# Patient Record
Sex: FEMALE | Race: WHITE | NOT HISPANIC OR LATINO
[De-identification: names, ages, dates, MRNs, and addresses within clinical notes are randomized per-mention and may not be internally consistent; named-entity substitution may affect disease eponyms.]

---

## 2017-01-16 ENCOUNTER — RX ONLY (OUTPATIENT)
Age: 47
Setting detail: RX ONLY
End: 2017-01-16

## 2017-12-18 ENCOUNTER — RX ONLY (OUTPATIENT)
Age: 47
Setting detail: RX ONLY
End: 2017-12-18

## 2018-03-05 ENCOUNTER — APPOINTMENT (RX ONLY)
Dept: URBAN - NONMETROPOLITAN AREA CLINIC 13 | Facility: CLINIC | Age: 48
Setting detail: DERMATOLOGY
End: 2018-03-05

## 2018-03-05 DIAGNOSIS — Z41.9 ENCOUNTER FOR PROCEDURE FOR PURPOSES OTHER THAN REMEDYING HEALTH STATE, UNSPECIFIED: ICD-10-CM

## 2018-03-05 PROCEDURE — ? LASER HAIR REMOVAL

## 2018-03-05 NOTE — PROCEDURE: LASER HAIR REMOVAL
Cooling: DCD setting
Fluence (Will Not Render If 0): 20
Pre-Procedure: Prior to proceeding the treatment areas were cleaned and all present put on their eye protection.
Render Post-Care In The Note: No
Post-Procedure Care: Immediate endpoint: perifollicular erythema and edema. Vaseline and ice applied. Post care reviewed with patient.
Consent: Written consent obtained, risks reviewed including but not limited to crusting, scabbing, blistering, scarring, darker or lighter pigmentary change, paradoxical hair regrowth, incomplete removal of hair and infection.
Number Of Prepaid Treatments (Will Not Render If 0): 0
Pulse Duration: 5 ms
Spot Size: 1.5 mm
Post-Care Instructions: I reviewed with the patient in detail post-care instructions. Patient should avoid sun for a minimum of 4 weeks before and after treatment.
Laser Type: Alexandrite 755nm
Detail Level: Detailed
Cooling Override: 20/10
Spot Size: 12 mm

## 2018-03-14 ENCOUNTER — APPOINTMENT (RX ONLY)
Dept: URBAN - NONMETROPOLITAN AREA CLINIC 13 | Facility: CLINIC | Age: 48
Setting detail: DERMATOLOGY
End: 2018-03-14

## 2018-03-14 DIAGNOSIS — B35.1 TINEA UNGUIUM: ICD-10-CM

## 2018-03-14 PROCEDURE — ? PRODUCT LINE (OFFICE PRODUCTS)

## 2018-03-14 ASSESSMENT — LOCATION ZONE DERM: LOCATION ZONE: TOE

## 2018-03-14 ASSESSMENT — LOCATION SIMPLE DESCRIPTION DERM: LOCATION SIMPLE: RIGHT GREAT TOE

## 2018-03-14 ASSESSMENT — LOCATION DETAILED DESCRIPTION DERM: LOCATION DETAILED: RIGHT GREAT TOE

## 2018-03-14 NOTE — PROCEDURE: PRODUCT LINE (OFFICE PRODUCTS)
Product 12 Price (In Dollars - Numeric Only, No Special Characters Or $): 0.00
Product 7 Units: 0
Render Product Pricing In Note: Yes
Name Of Product 1: PC Antifungal nail solution
Product 1 Units: 1
Detail Level: Zone
Send Charges To Patient Encounter: No
Product 1 Application Directions: Apply to affected area QD. Lot#[id number]
Product 1 Price (In Dollars - Numeric Only, No Special Characters Or $): 45

## 2018-05-17 ENCOUNTER — APPOINTMENT (RX ONLY)
Dept: URBAN - NONMETROPOLITAN AREA CLINIC 13 | Facility: CLINIC | Age: 48
Setting detail: DERMATOLOGY
End: 2018-05-17

## 2018-05-17 DIAGNOSIS — L73.8 OTHER SPECIFIED FOLLICULAR DISORDERS: ICD-10-CM

## 2018-05-17 DIAGNOSIS — B35.1 TINEA UNGUIUM: ICD-10-CM

## 2018-05-17 DIAGNOSIS — L71.8 OTHER ROSACEA: ICD-10-CM | Status: STABLE

## 2018-05-17 DIAGNOSIS — L71.0 PERIORAL DERMATITIS: ICD-10-CM | Status: WORSENING

## 2018-05-17 DIAGNOSIS — B35.1 TINEA UNGUIUM: ICD-10-CM | Status: STABLE

## 2018-05-17 PROCEDURE — ? RECOMMENDATIONS

## 2018-05-17 PROCEDURE — 99213 OFFICE O/P EST LOW 20 MIN: CPT

## 2018-05-17 PROCEDURE — ? PRESCRIPTION

## 2018-05-17 PROCEDURE — ? PRODUCT LINE (OFFICE PRODUCTS)

## 2018-05-17 PROCEDURE — ? TREATMENT REGIMEN

## 2018-05-17 PROCEDURE — ? COUNSELING

## 2018-05-17 RX ORDER — MINOCYCLINE HYDROCHLORIDE 100 MG/1
CAPSULE ORAL
Qty: 30 | Refills: 1 | Status: ERX | COMMUNITY
Start: 2018-05-17

## 2018-05-17 RX ADMIN — MINOCYCLINE HYDROCHLORIDE: 100 CAPSULE ORAL at 00:00

## 2018-05-17 ASSESSMENT — LOCATION ZONE DERM
LOCATION ZONE: TOE
LOCATION ZONE: FACE
LOCATION ZONE: NOSE
LOCATION ZONE: TOE

## 2018-05-17 ASSESSMENT — LOCATION SIMPLE DESCRIPTION DERM
LOCATION SIMPLE: RIGHT CHEEK
LOCATION SIMPLE: LEFT 3RD TOE
LOCATION SIMPLE: NOSE
LOCATION SIMPLE: LEFT CHEEK
LOCATION SIMPLE: RIGHT GREAT TOE
LOCATION SIMPLE: RIGHT 2ND TOE
LOCATION SIMPLE: CHIN
LOCATION SIMPLE: RIGHT 3RD TOE
LOCATION SIMPLE: LEFT GREAT TOE
LOCATION SIMPLE: LEFT 4TH TOE
LOCATION SIMPLE: RIGHT GREAT TOE
LOCATION SIMPLE: LEFT 5TH TOE
LOCATION SIMPLE: RIGHT 4TH TOE
LOCATION SIMPLE: LEFT 2ND TOE

## 2018-05-17 ASSESSMENT — LOCATION DETAILED DESCRIPTION DERM
LOCATION DETAILED: RIGHT MEDIAL MALAR CHEEK
LOCATION DETAILED: LEFT INFERIOR MEDIAL MALAR CHEEK
LOCATION DETAILED: RIGHT GREAT TOE
LOCATION DETAILED: LEFT GREAT TOE
LOCATION DETAILED: RIGHT INFERIOR MEDIAL MALAR CHEEK
LOCATION DETAILED: RIGHT 4TH TOE
LOCATION DETAILED: RIGHT 2ND TOE
LOCATION DETAILED: LEFT 4TH TOE
LOCATION DETAILED: LEFT 2ND TOE
LOCATION DETAILED: LEFT 5TH TOE
LOCATION DETAILED: LEFT 3RD TOE
LOCATION DETAILED: NASAL DORSUM
LOCATION DETAILED: RIGHT CHIN
LOCATION DETAILED: RIGHT 3RD TOE
LOCATION DETAILED: RIGHT DISTAL PLANTAR GREAT TOE

## 2018-05-17 NOTE — PROCEDURE: PRODUCT LINE (OFFICE PRODUCTS)
Product 1 Price (In Dollars - Numeric Only, No Special Characters Or $): 45.00
Product 1 Application Directions: Apply once daily.
Detail Level: Zone
Send Charges To Patient Encounter: No
Name Of Product 1: Anti-Fungal topical nail solution
Render Product Pricing In Note: Yes
Product 1 Units: 1
Product 42 Price (In Dollars - Numeric Only, No Special Characters Or $): 0.00
Product 63 Units: 0

## 2018-05-17 NOTE — PROCEDURE: RECOMMENDATIONS
Recommendations (Free Text): Evaluation by podiatrist to help sand the thickness.
Recommendation Preamble: The following recommendations were made during the visit:
Detail Level: Zone
Recommendations (Free Text): Using a barrier cloth underneath cpap mask at night.

## 2018-05-17 NOTE — PROCEDURE: TREATMENT REGIMEN
Continue Regimen: PC antifungal nail solution QD
Detail Level: Zone
Initiate Treatment: Minocycline 100mg po 1 pill QD
Continue Regimen: Rosacea triple gel QD
Samples Given: Eucrisa ointment

## 2018-06-07 ENCOUNTER — APPOINTMENT (RX ONLY)
Dept: URBAN - NONMETROPOLITAN AREA CLINIC 13 | Facility: CLINIC | Age: 48
Setting detail: DERMATOLOGY
End: 2018-06-07

## 2018-06-07 DIAGNOSIS — B35.1 TINEA UNGUIUM: ICD-10-CM

## 2018-06-07 PROCEDURE — ? PRODUCT LINE (OFFICE PRODUCTS)

## 2018-06-07 ASSESSMENT — LOCATION ZONE DERM: LOCATION ZONE: TOENAIL

## 2018-06-07 ASSESSMENT — LOCATION SIMPLE DESCRIPTION DERM: LOCATION SIMPLE: LEFT GREAT TOE

## 2018-06-07 ASSESSMENT — LOCATION DETAILED DESCRIPTION DERM: LOCATION DETAILED: LEFT GREAT TOENAIL

## 2018-06-07 NOTE — PROCEDURE: PRODUCT LINE (OFFICE PRODUCTS)
Product 59 Price (In Dollars - Numeric Only, No Special Characters Or $): 0.00
Product 45 Units: 0
Product 8 Price (In Dollars - Numeric Only, No Special Characters Or $): 45
Render Product Pricing In Note: Yes
Product 5 Application Directions: AAA QD— 3RF
Product 1 Application Directions: AAA QHS x3 weeks
Product 8 Units: 1
Product 7 Application Directions: AAA QD\\[id number]
Name Of Product 2: Dermatitis Ointment \\[id number]@1
Product 4 Application Directions: AAA QD
Name Of Product 4: Acne Triple Gel
Product 8 Application Directions: [facility] QHS— 4RF
Name Of Product 8: Anti Fungal Nail Solution\\[id number]@
Name Of Product 1: AK Gel\\[id number]
Name Of Product 3: Tretinoin Hydrating Cream\\[id number]@7
Detail Level: Zone
Name Of Product 6: Dapsone\\[id number]@8
Name Of Product 7: Acne Rosacea Cleanser
Product 2 Application Directions: AAA BID x2 weeks
Send Charges To Patient Encounter: No
Name Of Product 5: Rosacea Triple Gel\\[id number]@

## 2018-06-20 ENCOUNTER — APPOINTMENT (RX ONLY)
Dept: URBAN - NONMETROPOLITAN AREA CLINIC 13 | Facility: CLINIC | Age: 48
Setting detail: DERMATOLOGY
End: 2018-06-20

## 2018-06-20 DIAGNOSIS — Z41.9 ENCOUNTER FOR PROCEDURE FOR PURPOSES OTHER THAN REMEDYING HEALTH STATE, UNSPECIFIED: ICD-10-CM

## 2018-06-20 PROCEDURE — ? LASER HAIR REMOVAL

## 2018-06-20 NOTE — PROCEDURE: LASER HAIR REMOVAL
Treatment Number: 0
Price (Use Numbers Only, No Special Characters Or $): 50.00
Spot Size: 1.5 mm
Pre-Procedure: Prior to proceeding the treatment areas were cleaned and all present put on their eye protection.
Fluence (Will Not Render If 0): 20
Cooling: DCD setting
Pulse Duration: 5 ms
Post-Care Instructions: I reviewed with the patient in detail post-care instructions. Patient should avoid sun for a minimum of 4 weeks before and after treatment.
Render Post-Care In The Note: No
Cooling Override: 20/10
Detail Level: Detailed
Fluence (Will Not Render If 0): 24
Laser Type: Alexandrite 755nm
Post-Procedure Care: Immediate endpoint: perifollicular erythema and edema. Post care reviewed with patient.
Spot Size: 12 mm
Consent: Written consent obtained, risks reviewed including but not limited to crusting, scabbing, blistering, scarring, darker or lighter pigmentary change, paradoxical hair regrowth, incomplete removal of hair and infection.

## 2018-09-17 ENCOUNTER — APPOINTMENT (RX ONLY)
Dept: URBAN - NONMETROPOLITAN AREA CLINIC 4 | Facility: CLINIC | Age: 48
Setting detail: DERMATOLOGY
End: 2018-09-17

## 2018-09-17 DIAGNOSIS — L57.0 ACTINIC KERATOSIS: ICD-10-CM

## 2018-09-17 DIAGNOSIS — L82.1 OTHER SEBORRHEIC KERATOSIS: ICD-10-CM

## 2018-09-17 DIAGNOSIS — B35.1 TINEA UNGUIUM: ICD-10-CM | Status: INADEQUATELY CONTROLLED

## 2018-09-17 DIAGNOSIS — D18.0 HEMANGIOMA: ICD-10-CM

## 2018-09-17 DIAGNOSIS — L71.8 OTHER ROSACEA: ICD-10-CM | Status: INADEQUATELY CONTROLLED

## 2018-09-17 DIAGNOSIS — D22 MELANOCYTIC NEVI: ICD-10-CM

## 2018-09-17 DIAGNOSIS — L91.8 OTHER HYPERTROPHIC DISORDERS OF THE SKIN: ICD-10-CM

## 2018-09-17 DIAGNOSIS — L73.8 OTHER SPECIFIED FOLLICULAR DISORDERS: ICD-10-CM

## 2018-09-17 DIAGNOSIS — L57.8 OTHER SKIN CHANGES DUE TO CHRONIC EXPOSURE TO NONIONIZING RADIATION: ICD-10-CM

## 2018-09-17 DIAGNOSIS — B00.1 HERPESVIRAL VESICULAR DERMATITIS: ICD-10-CM | Status: RESOLVING

## 2018-09-17 PROBLEM — D18.01 HEMANGIOMA OF SKIN AND SUBCUTANEOUS TISSUE: Status: ACTIVE | Noted: 2018-09-17

## 2018-09-17 PROBLEM — D22.5 MELANOCYTIC NEVI OF TRUNK: Status: ACTIVE | Noted: 2018-09-17

## 2018-09-17 PROCEDURE — ? COUNSELING

## 2018-09-17 PROCEDURE — ? LIQUID NITROGEN

## 2018-09-17 PROCEDURE — ? PRODUCT LINE (OFFICE PRODUCTS)

## 2018-09-17 PROCEDURE — ? SUNSCREEN RECOMMENDATIONS

## 2018-09-17 PROCEDURE — 99214 OFFICE O/P EST MOD 30 MIN: CPT | Mod: 25

## 2018-09-17 PROCEDURE — 17000 DESTRUCT PREMALG LESION: CPT

## 2018-09-17 ASSESSMENT — LOCATION DETAILED DESCRIPTION DERM
LOCATION DETAILED: LEFT GREAT TOENAIL
LOCATION DETAILED: LEFT INFERIOR FOREHEAD
LOCATION DETAILED: RIGHT INFERIOR VERMILION LIP
LOCATION DETAILED: RIGHT GREAT TOENAIL
LOCATION DETAILED: RIGHT INFERIOR LATERAL NECK
LOCATION DETAILED: LEFT INFERIOR MEDIAL MALAR CHEEK
LOCATION DETAILED: RIGHT ANTERIOR SHOULDER
LOCATION DETAILED: LEFT SUPERIOR UPPER BACK
LOCATION DETAILED: RIGHT INFERIOR CENTRAL MALAR CHEEK
LOCATION DETAILED: RIGHT SUPERIOR LATERAL UPPER BACK
LOCATION DETAILED: LEFT MEDIAL FOREHEAD
LOCATION DETAILED: LEFT ANTERIOR PROXIMAL UPPER ARM

## 2018-09-17 ASSESSMENT — LOCATION ZONE DERM
LOCATION ZONE: TRUNK
LOCATION ZONE: FACE
LOCATION ZONE: ARM
LOCATION ZONE: LIP
LOCATION ZONE: NECK
LOCATION ZONE: TOENAIL

## 2018-09-17 ASSESSMENT — LOCATION SIMPLE DESCRIPTION DERM
LOCATION SIMPLE: RIGHT BACK
LOCATION SIMPLE: LEFT FOREHEAD
LOCATION SIMPLE: LEFT CHEEK
LOCATION SIMPLE: RIGHT CHEEK
LOCATION SIMPLE: RIGHT SHOULDER
LOCATION SIMPLE: RIGHT LIP
LOCATION SIMPLE: LEFT GREAT TOE
LOCATION SIMPLE: RIGHT GREAT TOE
LOCATION SIMPLE: LEFT UPPER ARM
LOCATION SIMPLE: RIGHT ANTERIOR NECK
LOCATION SIMPLE: LEFT UPPER BACK

## 2018-09-17 NOTE — PROCEDURE: PRODUCT LINE (OFFICE PRODUCTS)
Product 3 Application Directions: APPLY TO AFFECTED AREA DAILY
Product 36 Price (In Dollars - Numeric Only, No Special Characters Or $): 0.00
Product 56 Units: 0
Render Product Pricing In Note: Yes
Product 3 Units: 1
Detail Level: Zone
Product 2 Application Directions: APPLY SMALL AMOUNT EVENLY TO AREA DAILY
Product 2 Price (In Dollars - Numeric Only, No Special Characters Or $): 45.00
Name Of Product 3: ROSACEA TRIPLE GEL\\[id number]@
Name Of Product 1: ACNE TRIPLE GEL
Name Of Product 2: TRETINOIN .025% CREAM
Send Charges To Patient Encounter: No
Product 4 Price (In Dollars - Numeric Only, No Special Characters Or $): 45
Name Of Product 4: Anti fungal nail solution \\[id number]@6
Name Of Product 3: ROSACEA TRIPLE GEL
Product 4 Application Directions: [facility]

## 2018-09-17 NOTE — PROCEDURE: LIQUID NITROGEN
Detail Level: Detailed
Post-Care Instructions: I reviewed with the patient in detail post-care instructions. Patient is to wear sunprotection, and avoid picking at any of the treated lesions. Pt may apply Vaseline to crusted or scabbing areas.
Duration Of Freeze Thaw-Cycle (Seconds): 2
Render Post-Care Instructions In Note?: no
Consent: The patient's consent was obtained including but not limited to risks of crusting, scabbing, blistering, scarring, darker or lighter pigmentary change, recurrence, incomplete removal and infection.

## 2018-09-24 ENCOUNTER — APPOINTMENT (RX ONLY)
Dept: URBAN - NONMETROPOLITAN AREA CLINIC 13 | Facility: CLINIC | Age: 48
Setting detail: DERMATOLOGY
End: 2018-09-24

## 2018-09-24 DIAGNOSIS — Z41.9 ENCOUNTER FOR PROCEDURE FOR PURPOSES OTHER THAN REMEDYING HEALTH STATE, UNSPECIFIED: ICD-10-CM

## 2018-09-24 PROCEDURE — ? LASER HAIR REMOVAL

## 2018-09-24 NOTE — PROCEDURE: LASER HAIR REMOVAL
Cooling: DCD setting
Detail Level: Detailed
Render Post-Care In The Note: No
Fluence (Will Not Render If 0): 24
Price (Use Numbers Only, No Special Characters Or $): 50.00
Treatment Number: 0
Fluence (Will Not Render If 0): 20
Pulse Duration: 5 ms
Consent: Written consent obtained, risks reviewed including but not limited to crusting, scabbing, blistering, scarring, darker or lighter pigmentary change, paradoxical hair regrowth, incomplete removal of hair and infection.
Pre-Procedure: Prior to proceeding the treatment areas were cleaned and all present put on their eye protection.
Laser Type: Alexandrite 755nm
Spot Size: 1.5 mm
Post-Care Instructions: I reviewed with the patient in detail post-care instructions. Patient should avoid sun for a minimum of 4 weeks before and after treatment.
Post-Procedure Care: Immediate endpoint: perifollicular erythema and edema. Post care reviewed with patient.
Spot Size: 12 mm
Cooling Override: 20/10

## 2018-11-01 ENCOUNTER — APPOINTMENT (RX ONLY)
Dept: URBAN - NONMETROPOLITAN AREA CLINIC 13 | Facility: CLINIC | Age: 48
Setting detail: DERMATOLOGY
End: 2018-11-01

## 2018-11-01 DIAGNOSIS — Z41.9 ENCOUNTER FOR PROCEDURE FOR PURPOSES OTHER THAN REMEDYING HEALTH STATE, UNSPECIFIED: ICD-10-CM

## 2018-11-01 PROCEDURE — ? LASER HAIR REMOVAL

## 2018-11-01 NOTE — PROCEDURE: LASER HAIR REMOVAL
Number Of Prepaid Treatments (Will Not Render If 0): 0
Pulse Duration: 5 ms
Fluence (Will Not Render If 0): 20
Detail Level: Detailed
Fluence (Will Not Render If 0): 22
Post-Procedure Care: Immediate endpoint: perifollicular erythema and edema. Post care reviewed with patient.
Price (Use Numbers Only, No Special Characters Or $): 50.00
Cooling: DCD setting
Render Post-Care In The Note: No
Cooling Override: 20/10
Laser Type: Alexandrite 755nm
Pre-Procedure: Prior to proceeding the treatment areas were cleaned and all present put on their eye protection.
Consent: Written consent obtained, risks reviewed including but not limited to crusting, scabbing, blistering, scarring, darker or lighter pigmentary change, paradoxical hair regrowth, incomplete removal of hair and infection.
Spot Size: 1.5 mm
Spot Size: 12 mm
Post-Care Instructions: I reviewed with the patient in detail post-care instructions. Patient should avoid sun for a minimum of 4 weeks before and after treatment.

## 2019-01-10 ENCOUNTER — APPOINTMENT (RX ONLY)
Dept: URBAN - NONMETROPOLITAN AREA CLINIC 13 | Facility: CLINIC | Age: 49
Setting detail: DERMATOLOGY
End: 2019-01-10

## 2019-01-10 DIAGNOSIS — Z41.9 ENCOUNTER FOR PROCEDURE FOR PURPOSES OTHER THAN REMEDYING HEALTH STATE, UNSPECIFIED: ICD-10-CM

## 2019-01-10 DIAGNOSIS — B35.1 TINEA UNGUIUM: ICD-10-CM

## 2019-01-10 PROCEDURE — ? LASER HAIR REMOVAL

## 2019-01-10 PROCEDURE — ? IN-HOUSE DISPENSING PHARMACY

## 2019-01-10 ASSESSMENT — LOCATION SIMPLE DESCRIPTION DERM: LOCATION SIMPLE: LEFT GREAT TOE

## 2019-01-10 ASSESSMENT — LOCATION DETAILED DESCRIPTION DERM: LOCATION DETAILED: LEFT GREAT TOENAIL

## 2019-01-10 ASSESSMENT — LOCATION ZONE DERM: LOCATION ZONE: TOENAIL

## 2019-01-10 NOTE — PROCEDURE: LASER HAIR REMOVAL
Post-Procedure Care: Immediate endpoint: perifollicular erythema and edema. Post care reviewed with patient.
Fluence (Will Not Render If 0): 20
Spot Size: 1.5 mm
Cooling: DCD setting
Price (Use Numbers Only, No Special Characters Or $): 50.00
Number Of Prepaid Treatments (Will Not Render If 0): 0
Post-Care Instructions: I reviewed with the patient in detail post-care instructions. Patient should avoid sun for a minimum of 4 weeks before and after treatment.
Pulse Duration: 5 ms
Fluence (Will Not Render If 0): 18
Laser Type: Alexandrite 755nm
Render Post-Care In The Note: No
Detail Level: Detailed
Spot Size: 12 mm
Pre-Procedure: Prior to proceeding the treatment areas were cleaned and all present put on their eye protection.
Cooling Override: 20/10
Consent: Written consent obtained, risks reviewed including but not limited to crusting, scabbing, blistering, scarring, darker or lighter pigmentary change, paradoxical hair regrowth, incomplete removal of hair and infection.

## 2019-01-10 NOTE — PROCEDURE: IN-HOUSE DISPENSING PHARMACY
Product 80 Unit Type: mg
Product 67 Amount/Unit (Numbers Only): 0
Product 1 Units Dispensed: 2
Product 1 Application Directions: AA nails QHS ongoing
Product 1 Unit Type: ml
Render Product Pricing In Note: No
Name Of Product 1: BDC Anti-fungal solution
Product 1 Price/Unit (In Dollars): 45
Product 1 Refills: 6
Product 1 Amount/Unit (Numbers Only): 30
Detail Level: Zone

## 2019-05-24 ENCOUNTER — APPOINTMENT (RX ONLY)
Dept: URBAN - NONMETROPOLITAN AREA CLINIC 13 | Facility: CLINIC | Age: 49
Setting detail: DERMATOLOGY
End: 2019-05-24

## 2019-05-24 DIAGNOSIS — Z41.9 ENCOUNTER FOR PROCEDURE FOR PURPOSES OTHER THAN REMEDYING HEALTH STATE, UNSPECIFIED: ICD-10-CM

## 2019-05-24 PROCEDURE — ? LASER HAIR REMOVAL

## 2019-05-24 NOTE — PROCEDURE: LASER HAIR REMOVAL
Treatment Number: 0
Cooling: DCD setting
Pre-Procedure: Prior to proceeding the treatment areas were cleaned and all present put on their eye protection.
Spot Size: 12 mm
Consent: Written consent obtained, risks reviewed including but not limited to crusting, scabbing, blistering, scarring, darker or lighter pigmentary change, paradoxical hair regrowth, incomplete removal of hair and infection.
Cooling Override: 20/10
Fluence (Will Not Render If 0): 20
Price (Use Numbers Only, No Special Characters Or $): 50.00
Post-Care Instructions: I reviewed with the patient in detail post-care instructions. Patient should avoid sun for a minimum of 4 weeks before and after treatment.
Post-Procedure Care: Immediate endpoint: perifollicular erythema and edema. Post care reviewed with patient.
Pulse Duration: 5 ms
Fluence (Will Not Render If 0): 22
Laser Type: Alexandrite 755nm
Render Post-Care In The Note: No
Detail Level: Detailed
Spot Size: 1.5 mm
Fluence (Will Not Render If 0): 24

## 2019-07-25 ENCOUNTER — APPOINTMENT (RX ONLY)
Dept: URBAN - NONMETROPOLITAN AREA CLINIC 13 | Facility: CLINIC | Age: 49
Setting detail: DERMATOLOGY
End: 2019-07-25

## 2019-07-25 PROCEDURE — ? PRODUCT LINE

## 2019-07-25 NOTE — PROCEDURE: PRODUCT LINE
Product 91 Price (In Dollars - Numeric Only, No Special Characters Or $): 0
Product 5 Application Directions: AAA as directed\\n\\nLOT# [id number]\\nRefill: 2\\nBARCODE: [id number]
Product 3 Units: 1
Product 7 Price (In Dollars - Numeric Only, No Special Characters Or $): 45
Product 4 Application Directions: AAA Monday-Friday qd for 2 weeks
Product 2 Application Directions: Apply as directed by provider. \\n\\[id number]@12\\n\\nBARCODE# [id number]
Name Of Product 6: Dermatitis ointment (clobetasol)
Render Product Pricing In Note: Yes
Product 3 Application Directions: AAA as directed by the provider\\nLot: [id number]\\nRefills: 8\\nBarcode: [id number]
Product 7 Application Directions: AAA as directed by provider. \\n\\nLOT: [id number]\\nBARCODE#: [id number]
Product 1 Application Directions: AAA as directed.\\n\\[id number]@14\\nREFILL:2\\nBarcode: [id number]
Name Of Product 5: Dapsone 8.5%
Product 8 Application Directions: AAA as described by provider\\n\\nLot: [id number]\\nRefills: 0 (patient notified to follow up with ST)\\nBarcode#: [id number]
Name Of Product 3: Antifungal
Product 6 Application Directions: AAA qd PRN\\n\\nLOT: [id number]\\nREFILLS:2\\nBARCODE ID#: [id number]
Name Of Product 4: Actinic Keratosis Gel
Product 4 Price (In Dollars - Numeric Only, No Special Characters Or $): 50
Name Of Product 8: Dermatitis solution
Name Of Product 2: Tacrolimus
Name Of Product 7: Melasma Emulsion
Name Of Product 1: Tretinoin 0.025% hydrating cream
Detail Level: Zone

## 2019-09-14 ENCOUNTER — APPOINTMENT (RX ONLY)
Dept: URBAN - NONMETROPOLITAN AREA CLINIC 13 | Facility: CLINIC | Age: 49
Setting detail: DERMATOLOGY
End: 2019-09-14

## 2019-09-14 DIAGNOSIS — Z41.9 ENCOUNTER FOR PROCEDURE FOR PURPOSES OTHER THAN REMEDYING HEALTH STATE, UNSPECIFIED: ICD-10-CM

## 2019-09-14 PROCEDURE — ? LASER HAIR REMOVAL

## 2019-09-14 NOTE — PROCEDURE: LASER HAIR REMOVAL
Cooling: DCD setting
Number Of Prepaid Treatments (Will Not Render If 0): 0
Tolerated Procedure (Optional): 10 out of 10
Laser Type: Alexandrite 755nm
Fluence (Will Not Render If 0): 20
Spot Size: 1.5 mm
Render Post-Care In The Note: No
Pulse Duration: 5 ms
Fluence (Will Not Render If 0): 14
Spot Size: 12 mm
Pre-Procedure: Prior to proceeding the treatment areas were cleaned and all present put on their eye protection.
Detail Level: Detailed
Cooling Override: 20/10
Shaving (Optional): The patient shaved at home
Pulse Duration: 20 ms
Consent: Written consent obtained, risks reviewed including but not limited to crusting, scabbing, blistering, scarring, darker or lighter pigmentary change, paradoxical hair regrowth, incomplete removal of hair and infection.
Post-Procedure Care: Immediate endpoint: perifollicular erythema and edema. Post care reviewed with patient.
Price (Use Numbers Only, No Special Characters Or $): 50.00
Post-Care Instructions: I reviewed with the patient in detail post-care instructions. Patient should avoid sun for a minimum of 4 weeks before and after treatment.

## 2019-10-21 ENCOUNTER — APPOINTMENT (RX ONLY)
Dept: URBAN - NONMETROPOLITAN AREA CLINIC 13 | Facility: CLINIC | Age: 49
Setting detail: DERMATOLOGY
End: 2019-10-21

## 2019-10-21 DIAGNOSIS — B35.1 TINEA UNGUIUM: ICD-10-CM

## 2019-10-21 DIAGNOSIS — Z41.9 ENCOUNTER FOR PROCEDURE FOR PURPOSES OTHER THAN REMEDYING HEALTH STATE, UNSPECIFIED: ICD-10-CM

## 2019-10-21 PROCEDURE — ? LASER HAIR REMOVAL

## 2019-10-21 PROCEDURE — ? PRODUCT LINE (OFFICE PRODUCTS)

## 2019-10-21 NOTE — PROCEDURE: LASER HAIR REMOVAL
Treatment Number: 0
Post-Care Instructions: I reviewed with the patient in detail post-care instructions. Patient should avoid sun for a minimum of 4 weeks before and after treatment.
Shaving (Optional): The patient shaved at home
Laser Type: Alexandrite 755nm
Cooling: DCD setting
Spot Size: 1.5 mm
Render Post-Care In The Note: No
Fluence (Will Not Render If 0): 20
Pre-Procedure: Prior to proceeding the treatment areas were cleaned and all present put on their eye protection.
Fluence (Will Not Render If 0): 14
Spot Size: 12 mm
Price (Use Numbers Only, No Special Characters Or $): 50
Pulse Duration: 5 ms
Cooling Override: 20/10
Pulse Duration: 20 ms
Tolerated Procedure (Optional): 10 out of 10
Consent: Written consent obtained, risks reviewed including but not limited to crusting, scabbing, blistering, scarring, darker or lighter pigmentary change, paradoxical hair regrowth, incomplete removal of hair and infection.
Post-Procedure Care: Immediate endpoint: perifollicular erythema and edema. Post care reviewed with patient.
Detail Level: Detailed

## 2019-10-21 NOTE — PROCEDURE: PRODUCT LINE (OFFICE PRODUCTS)
Product 35 Units: 0
Product 71 Price (In Dollars - Numeric Only, No Special Characters Or $): 0.00
Send Charges To Patient Encounter: No
Product 6 Price (In Dollars - Numeric Only, No Special Characters Or $): 53
Product 9 Application Directions: Aaa qhs \\n2RF
Product 3 Price (In Dollars - Numeric Only, No Special Characters Or $): 45
Product 6 Application Directions: AAA QD — 3RF
Name Of Product 10: Tacrolimus .1% cream\\[id number]@21\\[id number]
Name Of Product 7: Acne Rosacea Cleanser
Product 3 Application Directions: AAA QD
Render Product Pricing In Note: Yes
Name Of Product 4: Dermatitis Solution \\[id number]@
Name Of Product 1: Actinic Keratosis Gel\\[id number]@3\\[id number]
Product 7 Application Directions: AAA QD\\[id number]-d
Product 10 Application Directions: AAA QD prn
Product 1 Application Directions: AAA M-F x4 weeks
Name Of Product 8: Anti Fungal Nail Solution\\[id number]@18\\[id number]
Product 4 Application Directions: AAA QFD-BID PRN- 3RF
Name Of Product 5: Rosacea Triple Gel\\k152698hecztxpy@3\\[id number]
Name Of Product 2: Dermatitis Ointment \\y515381xryfiwtt@10\\[id number]
Product 8 Units: 1
Product 2 Price (In Dollars - Numeric Only, No Special Characters Or $): 48
Product 8 Application Directions: [facility]
Detail Level: Zone
Name Of Product 6: Dapsone\\[id number]@10\\[id number]
Product 2 Application Directions: AAA BID— 3RF
Name Of Product 9: Dapsone
Name Of Product 3: Tretinoin Hydrating Cream\\j267068bnrlyrwp@15\\[id number]

## 2019-12-02 ENCOUNTER — APPOINTMENT (RX ONLY)
Dept: URBAN - NONMETROPOLITAN AREA CLINIC 13 | Facility: CLINIC | Age: 49
Setting detail: DERMATOLOGY
End: 2019-12-02

## 2019-12-02 DIAGNOSIS — Z41.9 ENCOUNTER FOR PROCEDURE FOR PURPOSES OTHER THAN REMEDYING HEALTH STATE, UNSPECIFIED: ICD-10-CM

## 2019-12-02 PROCEDURE — ? LASER HAIR REMOVAL

## 2019-12-02 NOTE — PROCEDURE: LASER HAIR REMOVAL
Pulse Duration: 5 ms
Treatment Number: 0
Fluence (Will Not Render If 0): 20
Laser Type: Alexandrite 755nm
Post-Care Instructions: I reviewed with the patient in detail post-care instructions. Patient should avoid sun for a minimum of 4 weeks before and after treatment.
Cooling: DCD setting
Detail Level: Detailed
Spot Size: 1.5 mm
Render Post-Care In The Note: No
Shaving (Optional): The patient shaved at home
Fluence (Will Not Render If 0): 14
Pre-Procedure: Prior to proceeding the treatment areas were cleaned and all present put on their eye protection.
Spot Size: 12 mm
Pulse Duration: 20 ms
Cooling Override: 20/10
Price (Use Numbers Only, No Special Characters Or $): 50.00
Post-Procedure Care: Immediate endpoint: perifollicular erythema and edema. Post care reviewed with patient.
Consent: Written consent obtained, risks reviewed including but not limited to crusting, scabbing, blistering, scarring, darker or lighter pigmentary change, paradoxical hair regrowth, incomplete removal of hair and infection.
Tolerated Procedure (Optional): 10 out of 10

## 2019-12-10 ENCOUNTER — APPOINTMENT (RX ONLY)
Dept: URBAN - NONMETROPOLITAN AREA CLINIC 4 | Facility: CLINIC | Age: 49
Setting detail: DERMATOLOGY
End: 2019-12-10

## 2019-12-10 DIAGNOSIS — B35.1 TINEA UNGUIUM: ICD-10-CM

## 2019-12-10 PROCEDURE — ? PRODUCT LINE (OFFICE PRODUCTS)

## 2019-12-10 NOTE — PROCEDURE: PRODUCT LINE (OFFICE PRODUCTS)
Product 6 Units: 0
Product 40 Price (In Dollars - Numeric Only, No Special Characters Or $): 0.00
Name Of Product 6: Acne Triple Gel
Product 8 Application Directions: Apply to face [facility].        Lot # [id number].   Barcode # [id number]
Product 11 Price (In Dollars - Numeric Only, No Special Characters Or $): 45.00
Product 6 Application Directions: Apply to face.         Lot# [id number].
Name Of Product 1: ABIDA SAVAGE ROSACEA TRIPLE GEL
Product 3 Application Directions: Apply BID ongoing.    Lot # [id number]. Barcode # [id number]
Name Of Product 9: Dapsone
Product 11 Units: 1
Name Of Product 7: AK Gel
Product 11 Application Directions: Apply QD.  Lot #[id number].  Barcode # [id number]
Name Of Product 4: Dermatitis Solution
Product 9 Price (In Dollars - Numeric Only, No Special Characters Or $): 53.00
Product 1 Application Directions: Apply [facility] Lot # [id number].   Barcode # [id number]
Product 9 Application Directions: Apply to face QD.   Lot # [id number].   Barcode # [id number]
Detail Level: Zone
Product 7 Application Directions: Apply to AA on chest M-F x 2 weeks take 2 weeks off, then repeat x 2 weeks\\nLot # [id number]\\Bannerode # [id number]
Name Of Product 2: Dermatitis Ointment Barcode
Product 4 Application Directions: Apply [facility].    Lot # [id number].   Barcode # [id number]
Name Of Product 10: Tacrolimus Solutin
Name Of Product 8: Melasma Emulsion
Allow Plan To Count Towards E/M Coding: No (this will remove associated impression from E/M calculation)
Product 2 Price (In Dollars - Numeric Only, No Special Characters Or $): 48.00
Name Of Product 5: Tretinoin
Product 10 Price (In Dollars - Numeric Only, No Special Characters Or $): 75.00
Product 2 Application Directions: Apply to affected areas BID     Lot # [id number]. Barcode # [id number]
Send Charges To Patient Encounter: No
Product 10 Application Directions: Apply to scalp nightly 5 night a week.    Lot # [id number].    Barcode # [id number]
Name Of Product 3: Tacrolimus
Product 5 Application Directions: Apply to face [facility].   LOT# [id number].    BARCODE  # [id number]
Render Product Pricing In Note: Yes
Name Of Product 11: Anti Fungal Nail Sol

## 2019-12-19 ENCOUNTER — APPOINTMENT (RX ONLY)
Dept: URBAN - NONMETROPOLITAN AREA CLINIC 13 | Facility: CLINIC | Age: 49
Setting detail: DERMATOLOGY
End: 2019-12-19

## 2019-12-19 DIAGNOSIS — D22 MELANOCYTIC NEVI: ICD-10-CM

## 2019-12-19 DIAGNOSIS — L57.8 OTHER SKIN CHANGES DUE TO CHRONIC EXPOSURE TO NONIONIZING RADIATION: ICD-10-CM

## 2019-12-19 DIAGNOSIS — L71.8 OTHER ROSACEA: ICD-10-CM

## 2019-12-19 DIAGNOSIS — L82.1 OTHER SEBORRHEIC KERATOSIS: ICD-10-CM

## 2019-12-19 DIAGNOSIS — D18.0 HEMANGIOMA: ICD-10-CM

## 2019-12-19 DIAGNOSIS — L21.8 OTHER SEBORRHEIC DERMATITIS: ICD-10-CM

## 2019-12-19 DIAGNOSIS — B35.1 TINEA UNGUIUM: ICD-10-CM

## 2019-12-19 PROBLEM — D22.5 MELANOCYTIC NEVI OF TRUNK: Status: ACTIVE | Noted: 2019-12-19

## 2019-12-19 PROBLEM — D18.01 HEMANGIOMA OF SKIN AND SUBCUTANEOUS TISSUE: Status: ACTIVE | Noted: 2019-12-19

## 2019-12-19 PROCEDURE — ? PRESCRIPTION

## 2019-12-19 PROCEDURE — ? ADDITIONAL NOTES

## 2019-12-19 PROCEDURE — ? SUNSCREEN RECOMMENDATIONS

## 2019-12-19 PROCEDURE — ? COUNSELING

## 2019-12-19 PROCEDURE — ? IN-HOUSE DISPENSING PHARMACY

## 2019-12-19 PROCEDURE — 99213 OFFICE O/P EST LOW 20 MIN: CPT

## 2019-12-19 RX ADMIN — CLOBETASOL PROPIONATE AAA: 0.5 SOLUTION TOPICAL at 00:00

## 2019-12-19 RX ADMIN — IVERMECTIN AAA: 10 CREAM TOPICAL at 00:00

## 2019-12-19 ASSESSMENT — LOCATION SIMPLE DESCRIPTION DERM
LOCATION SIMPLE: RIGHT CHEEK
LOCATION SIMPLE: LEFT CHEEK
LOCATION SIMPLE: UPPER BACK
LOCATION SIMPLE: LEFT LOWER BACK
LOCATION SIMPLE: SUPERIOR FOREHEAD
LOCATION SIMPLE: RIGHT GREAT TOE
LOCATION SIMPLE: LEFT GREAT TOE
LOCATION SIMPLE: LEFT UPPER BACK

## 2019-12-19 ASSESSMENT — LOCATION DETAILED DESCRIPTION DERM
LOCATION DETAILED: RIGHT CENTRAL MALAR CHEEK
LOCATION DETAILED: LEFT SUPERIOR MEDIAL MIDBACK
LOCATION DETAILED: SUPERIOR THORACIC SPINE
LOCATION DETAILED: RIGHT GREAT TOENAIL
LOCATION DETAILED: LEFT GREAT TOENAIL
LOCATION DETAILED: LEFT MEDIAL UPPER BACK
LOCATION DETAILED: LEFT CENTRAL MALAR CHEEK
LOCATION DETAILED: SUPERIOR MID FOREHEAD

## 2019-12-19 ASSESSMENT — LOCATION ZONE DERM
LOCATION ZONE: TOENAIL
LOCATION ZONE: FACE
LOCATION ZONE: TRUNK

## 2019-12-19 NOTE — PROCEDURE: IN-HOUSE DISPENSING PHARMACY
Product 23 Price/Unit (In Dollars): 0
Product 20 Unit Type: mg
Send Charges To Patient Encounter: Yes
Name Of Product 1: ANTI FUNGAL NAIL SOLUTION
Product 1 Refills: 10
Detail Level: Zone
Product 1 Price/Unit (In Dollars): 45
Product 1 Application Directions: AAA QD \\nLOT: [id number] BARCODE: N/A

## 2019-12-19 NOTE — PROCEDURE: ADDITIONAL NOTES
Detail Level: Simple
Additional Notes: PT TOLD TO CONTINUE PEDICURES AND BDC TOPICAL SOLUTION.
Additional Notes: PT CAN CC FOR RX OF DOXY WHEN FLARED.

## 2019-12-20 RX ORDER — IVERMECTIN 10 MG/G
AAA CREAM TOPICAL QHS
Qty: 45 | Refills: 3 | Status: ERX | COMMUNITY
Start: 2019-12-19

## 2019-12-20 RX ORDER — CLOBETASOL PROPIONATE 0.5 MG/ML
AAA SOLUTION TOPICAL BID
Qty: 50 | Refills: 5 | Status: ERX | COMMUNITY
Start: 2019-12-19

## 2020-01-14 ENCOUNTER — APPOINTMENT (RX ONLY)
Dept: URBAN - NONMETROPOLITAN AREA CLINIC 13 | Facility: CLINIC | Age: 50
Setting detail: DERMATOLOGY
End: 2020-01-14

## 2020-01-14 DIAGNOSIS — Z41.9 ENCOUNTER FOR PROCEDURE FOR PURPOSES OTHER THAN REMEDYING HEALTH STATE, UNSPECIFIED: ICD-10-CM

## 2020-01-14 PROCEDURE — ? LASER HAIR REMOVAL

## 2020-01-14 NOTE — PROCEDURE: LASER HAIR REMOVAL
Cooling: DCD setting
Pulse Duration: 5 ms
Fluence (Will Not Render If 0): 14
Treatment Number: 0
Pre-Procedure: Prior to proceeding the treatment areas were cleaned and all present put on their eye protection.
Price (Use Numbers Only, No Special Characters Or $): 50.00
Spot Size: 12 mm
Cooling Override: 20/10
Pulse Duration: 20 ms
Consent: Written consent obtained, risks reviewed including but not limited to crusting, scabbing, blistering, scarring, darker or lighter pigmentary change, paradoxical hair regrowth, incomplete removal of hair and infection.
Fluence (Will Not Render If 0): 20
Tolerated Procedure (Optional): 10 out of 10
Post-Procedure Care: Immediate endpoint: perifollicular erythema and edema. Post care reviewed with patient.
Detail Level: Detailed
Fluence (Will Not Render If 0): 22
Laser Type: Alexandrite 755nm
Post-Care Instructions: I reviewed with the patient in detail post-care instructions. Patient should avoid sun for a minimum of 4 weeks before and after treatment.
Shaving (Optional): The patient shaved at home
Render Post-Care In The Note: No
Spot Size: 1.5 mm

## 2020-02-19 ENCOUNTER — RX ONLY (OUTPATIENT)
Age: 50
Setting detail: RX ONLY
End: 2020-02-19

## 2020-02-19 RX ORDER — METRONIDAZOLE 7.5 MG/G
AAA CREAM TOPICAL QHS
Qty: 45 | Refills: 3 | Status: ERX | COMMUNITY
Start: 2020-02-19

## 2020-07-02 ENCOUNTER — APPOINTMENT (RX ONLY)
Dept: URBAN - NONMETROPOLITAN AREA CLINIC 13 | Facility: CLINIC | Age: 50
Setting detail: DERMATOLOGY
End: 2020-07-02

## 2020-07-02 DIAGNOSIS — Z41.9 ENCOUNTER FOR PROCEDURE FOR PURPOSES OTHER THAN REMEDYING HEALTH STATE, UNSPECIFIED: ICD-10-CM

## 2020-07-02 PROCEDURE — ? LASER HAIR REMOVAL

## 2020-07-02 NOTE — PROCEDURE: LASER HAIR REMOVAL
Price (Use Numbers Only, No Special Characters Or $): 50.00
Treatment Number: 0
Pre-Procedure: Prior to proceeding the treatment areas were cleaned and all present put on their eye protection.
Spot Size: 12 mm
Cooling: DCD setting
Tolerated Procedure (Optional): 10 out of 10
Render Post-Care In The Note: No
Detail Level: Detailed
Post-Procedure Care: Immediate endpoint: perifollicular erythema and edema. Post care reviewed with patient.
Fluence (Will Not Render If 0): 20
Pulse Duration: 5 ms
Post-Care Instructions: I reviewed with the patient in detail post-care instructions. Patient should avoid sun for a minimum of 4 weeks before and after treatment.
Fluence (Will Not Render If 0): 14
Cooling Override: 20/10
Shaving (Optional): The patient shaved at home
Consent: Written consent obtained, risks reviewed including but not limited to crusting, scabbing, blistering, scarring, darker or lighter pigmentary change, paradoxical hair regrowth, incomplete removal of hair and infection.
Eye Shield Text: Given the treatment area eye shields were inserted prior to treatment.
Laser Type: Alexandrite 755nm
Fluence (Will Not Render If 0): 22
Spot Size: 1.5 mm
Pulse Duration: 20 ms

## 2020-09-30 ENCOUNTER — APPOINTMENT (RX ONLY)
Dept: URBAN - NONMETROPOLITAN AREA CLINIC 13 | Facility: CLINIC | Age: 50
Setting detail: DERMATOLOGY
End: 2020-09-30

## 2020-09-30 DIAGNOSIS — Z41.9 ENCOUNTER FOR PROCEDURE FOR PURPOSES OTHER THAN REMEDYING HEALTH STATE, UNSPECIFIED: ICD-10-CM

## 2020-09-30 PROCEDURE — ? LASER HAIR REMOVAL

## 2020-09-30 NOTE — PROCEDURE: LASER HAIR REMOVAL
Cooling Override: 20/10
Treatment Number: 0
Pulse Duration: 20 ms
Render Post-Care In The Note: No
Pulse Duration: 5 ms
Spot Size: 12 mm
Laser Type: Alexandrite 755nm
Fluence (Will Not Render If 0): 20
Detail Level: Detailed
Cooling: DCD setting
Pre-Procedure: Prior to proceeding the treatment areas were cleaned and all present put on their eye protection.
Consent: Written consent obtained, risks reviewed including but not limited to crusting, scabbing, blistering, scarring, darker or lighter pigmentary change, paradoxical hair regrowth, incomplete removal of hair and infection.
Shaving (Optional): The patient shaved at home
Eye Shield Text: Given the treatment area eye shields were inserted prior to treatment.
Spot Size: 1.5 mm
Fluence (Will Not Render If 0): 14
Tolerated Procedure (Optional): 10 out of 10
Post-Procedure Care: Immediate endpoint: perifollicular erythema and edema. Post care reviewed with patient.
Post-Care Instructions: I reviewed with the patient in detail post-care instructions. Patient should avoid sun for a minimum of 4 weeks before and after treatment.
Price (Use Numbers Only, No Special Characters Or $): 50.00

## 2020-12-10 ENCOUNTER — APPOINTMENT (RX ONLY)
Dept: URBAN - NONMETROPOLITAN AREA CLINIC 13 | Facility: CLINIC | Age: 50
Setting detail: DERMATOLOGY
End: 2020-12-10

## 2020-12-10 DIAGNOSIS — Z41.9 ENCOUNTER FOR PROCEDURE FOR PURPOSES OTHER THAN REMEDYING HEALTH STATE, UNSPECIFIED: ICD-10-CM

## 2020-12-10 PROCEDURE — ? LASER HAIR REMOVAL

## 2020-12-10 NOTE — PROCEDURE: LASER HAIR REMOVAL
Fluence (Will Not Render If 0): 20
Treatment Number: 0
Price (Use Numbers Only, No Special Characters Or $): 50.00
Cooling: DCD setting
Post-Care Instructions: I reviewed with the patient in detail post-care instructions. Patient should avoid sun for a minimum of 4 weeks before and after treatment.
Pulse Duration: 5 ms
Spot Size: 1.5 mm
Pulse Duration: 20 ms
Detail Level: Detailed
Spot Size: 12 mm
Pre-Procedure: Prior to proceeding the treatment areas were cleaned and all present put on their eye protection.
Tolerated Procedure (Optional): 10 out of 10
Were Eye Shields Employed?: No
Post-Procedure Care: Immediate endpoint: perifollicular erythema and edema. Post care reviewed with patient.
Consent: Written consent obtained, risks reviewed including but not limited to crusting, scabbing, blistering, scarring, darker or lighter pigmentary change, paradoxical hair regrowth, incomplete removal of hair and infection.
Eye Shield Text: Given the treatment area eye shields were inserted prior to treatment.
Fluence (Will Not Render If 0): 24
Laser Type: Alexandrite 755nm
Cooling Override: 20/10
Fluence (Will Not Render If 0): 14
Shaving (Optional): The patient shaved at home

## 2021-03-10 ENCOUNTER — APPOINTMENT (RX ONLY)
Dept: URBAN - NONMETROPOLITAN AREA CLINIC 13 | Facility: CLINIC | Age: 51
Setting detail: DERMATOLOGY
End: 2021-03-10

## 2021-03-10 DIAGNOSIS — Z41.9 ENCOUNTER FOR PROCEDURE FOR PURPOSES OTHER THAN REMEDYING HEALTH STATE, UNSPECIFIED: ICD-10-CM

## 2021-03-10 PROCEDURE — ? LASER HAIR REMOVAL

## 2021-03-10 NOTE — PROCEDURE: LASER HAIR REMOVAL
Pulse Duration: 20 ms
Consent: Written consent obtained, risks reviewed including but not limited to crusting, scabbing, blistering, scarring, darker or lighter pigmentary change, paradoxical hair regrowth, incomplete removal of hair and infection.
Cooling Override: 20/10
Number Of Prepaid Treatments (Will Not Render If 0): 0
Spot Size: 1.5 mm
Were Eye Shields Employed?: No
Fluence (Will Not Render If 0): 26
Cooling: DCD setting
Spot Size: 12 mm
Laser Type: Alexandrite 755nm
Fluence (Will Not Render If 0): 20
Fluence (Will Not Render If 0): 24
Pulse Duration: 5 ms
Detail Level: Detailed
Pre-Procedure: Prior to proceeding the treatment areas were cleaned and all present put on their eye protection.
Shaving (Optional): The patient shaved at home
Eye Shield Text: Given the treatment area eye shields were inserted prior to treatment.
Post-Care Instructions: I reviewed with the patient in detail post-care instructions. Patient should avoid sun for a minimum of 4 weeks before and after treatment.
Post-Procedure Care: Immediate endpoint: perifollicular erythema and edema. Post care reviewed with patient.
Price (Use Numbers Only, No Special Characters Or $): 50.00
Tolerated Procedure (Optional): 10 out of 10

## 2021-05-08 ENCOUNTER — APPOINTMENT (RX ONLY)
Dept: URBAN - NONMETROPOLITAN AREA CLINIC 13 | Facility: CLINIC | Age: 51
Setting detail: DERMATOLOGY
End: 2021-05-08

## 2021-05-08 DIAGNOSIS — Z41.9 ENCOUNTER FOR PROCEDURE FOR PURPOSES OTHER THAN REMEDYING HEALTH STATE, UNSPECIFIED: ICD-10-CM

## 2021-05-08 PROCEDURE — ? LASER HAIR REMOVAL

## 2021-05-08 NOTE — PROCEDURE: LASER HAIR REMOVAL
Pulse Duration: 5 ms
Fluence (Will Not Render If 0): 22
Cooling: DCD setting
Treatment Number: 0
Pulse Duration: 20 ms
Render Post-Care In The Note: No
Detail Level: Detailed
Eye Shield Text: Given the treatment area eye shields were inserted prior to treatment.
Shaving (Optional): The patient shaved at home
Laser Type: Alexandrite 755nm
Spot Size: 12 mm
Fluence (Will Not Render If 0): 20
Price (Use Numbers Only, No Special Characters Or $): 50.00
Fluence (Will Not Render If 0): 16
Pre-Procedure: Prior to proceeding the treatment areas were cleaned and all present put on their eye protection.
Consent: Written consent obtained, risks reviewed including but not limited to crusting, scabbing, blistering, scarring, darker or lighter pigmentary change, paradoxical hair regrowth, incomplete removal of hair and infection.
Cooling Override: 20/10
Post-Procedure Care: Immediate endpoint: perifollicular erythema and edema. Post care reviewed with patient.
Post-Care Instructions: I reviewed with the patient in detail post-care instructions. Patient should avoid sun for a minimum of 4 weeks before and after treatment.
Spot Size: 1.5 mm
Tolerated Procedure (Optional): 10 out of 10

## 2021-07-01 ENCOUNTER — APPOINTMENT (RX ONLY)
Dept: URBAN - NONMETROPOLITAN AREA CLINIC 13 | Facility: CLINIC | Age: 51
Setting detail: DERMATOLOGY
End: 2021-07-01

## 2021-07-01 DIAGNOSIS — Z41.9 ENCOUNTER FOR PROCEDURE FOR PURPOSES OTHER THAN REMEDYING HEALTH STATE, UNSPECIFIED: ICD-10-CM

## 2021-07-01 PROCEDURE — ? LASER HAIR REMOVAL

## 2021-07-01 NOTE — PROCEDURE: LASER HAIR REMOVAL
Post-Procedure Care: Immediate endpoint: perifollicular erythema and edema. Post care reviewed with patient.
Post-Care Instructions: I reviewed with the patient in detail post-care instructions. Patient should avoid sun for a minimum of 4 weeks before and after treatment.
Fluence (Will Not Render If 0): 20
Spot Size: 1.5 mm
Cooling: DCD setting
Fluence (Will Not Render If 0): 22
Spot Size: 12 mm
Number Of Prepaid Treatments (Will Not Render If 0): 0
Pre-Procedure: Prior to proceeding the treatment areas were cleaned and all present put on their eye protection.
Pulse Duration: 5 ms
Render Post-Care In The Note: No
Eye Shield Text: Given the treatment area eye shields were inserted prior to treatment.
Cooling Override: 20/10
Shaving (Optional): The patient shaved at home
Laser Type: Alexandrite 755nm
Tolerated Procedure (Optional): 10 out of 10
Price (Use Numbers Only, No Special Characters Or $): 50.00
Consent: Written consent obtained, risks reviewed including but not limited to crusting, scabbing, blistering, scarring, darker or lighter pigmentary change, paradoxical hair regrowth, incomplete removal of hair and infection.
Detail Level: Detailed

## 2021-08-19 ENCOUNTER — APPOINTMENT (RX ONLY)
Dept: URBAN - NONMETROPOLITAN AREA CLINIC 13 | Facility: CLINIC | Age: 51
Setting detail: DERMATOLOGY
End: 2021-08-19

## 2021-08-19 DIAGNOSIS — Z41.9 ENCOUNTER FOR PROCEDURE FOR PURPOSES OTHER THAN REMEDYING HEALTH STATE, UNSPECIFIED: ICD-10-CM

## 2021-08-19 PROCEDURE — ? LASER HAIR REMOVAL

## 2021-08-19 NOTE — PROCEDURE: LASER HAIR REMOVAL
Number Of Prepaid Treatments (Will Not Render If 0): 0
Detail Level: Detailed
Fluence (Will Not Render If 0): 20
Cooling: DCD setting
Were Eye Shields Employed?: No
Fluence (Will Not Render If 0): 24
Pulse Duration: 5 ms
Laser Type: Alexandrite 755nm
Spot Size: 12 mm
Pre-Procedure: Prior to proceeding the treatment areas were cleaned and all present put on their eye protection.
Eye Shield Text: Given the treatment area eye shields were inserted prior to treatment.
Shaving (Optional): The patient shaved at home
Spot Size: 1.5 mm
Post-Procedure Care: Immediate endpoint: perifollicular erythema and edema. Post care reviewed with patient.
Post-Care Instructions: I reviewed with the patient in detail post-care instructions. Patient should avoid sun for a minimum of 4 weeks before and after treatment.
Consent: Written consent obtained, risks reviewed including but not limited to crusting, scabbing, blistering, scarring, darker or lighter pigmentary change, paradoxical hair regrowth, incomplete removal of hair and infection.
Price (Use Numbers Only, No Special Characters Or $): 50.00
Tolerated Procedure (Optional): 10 out of 10
Cooling Override: 20/10

## 2021-11-23 ENCOUNTER — APPOINTMENT (RX ONLY)
Dept: URBAN - NONMETROPOLITAN AREA CLINIC 13 | Facility: CLINIC | Age: 51
Setting detail: DERMATOLOGY
End: 2021-11-23

## 2021-11-23 DIAGNOSIS — Z41.9 ENCOUNTER FOR PROCEDURE FOR PURPOSES OTHER THAN REMEDYING HEALTH STATE, UNSPECIFIED: ICD-10-CM

## 2021-11-23 PROCEDURE — ? LASER HAIR REMOVAL

## 2021-11-23 NOTE — PROCEDURE: LASER HAIR REMOVAL
Pre-Procedure: Prior to proceeding the treatment areas were cleaned and all present put on their eye protection.
Fluence (Will Not Render If 0): 22
Spot Size: 12 mm
Shaving (Optional): The patient shaved at home
Treatment Number: 0
Detail Level: Detailed
Spot Size: 1.5 mm
Cooling Override: 20/10
Fluence (Will Not Render If 0): 20
Post-Procedure Care: Immediate endpoint: perifollicular erythema and edema. Post care reviewed with patient.
Consent: Written consent obtained, risks reviewed including but not limited to crusting, scabbing, blistering, scarring, darker or lighter pigmentary change, paradoxical hair regrowth, incomplete removal of hair and infection.
Eye Shield Text: Given the treatment area eye shields were inserted prior to treatment.
Cooling: DCD setting
Tolerated Procedure (Optional): 10 out of 10
Laser Type: Alexandrite 755nm
Price (Use Numbers Only, No Special Characters Or $): 50.00
Render Post-Care In The Note: No
Post-Care Instructions: I reviewed with the patient in detail post-care instructions. Patient should avoid sun for a minimum of 4 weeks before and after treatment.

## 2022-04-19 ENCOUNTER — APPOINTMENT (RX ONLY)
Dept: URBAN - NONMETROPOLITAN AREA CLINIC 13 | Facility: CLINIC | Age: 52
Setting detail: DERMATOLOGY
End: 2022-04-19

## 2022-04-19 DIAGNOSIS — Z41.9 ENCOUNTER FOR PROCEDURE FOR PURPOSES OTHER THAN REMEDYING HEALTH STATE, UNSPECIFIED: ICD-10-CM

## 2022-04-19 PROCEDURE — ? LASER HAIR REMOVAL

## 2022-04-19 NOTE — PROCEDURE: LASER HAIR REMOVAL
Were Eye Shields Employed?: No
Fluence (Will Not Render If 0): 20
Laser Type: Alexandrite 755nm
Treatment Number: 0
Detail Level: Detailed
Spot Size: 12 mm
Cooling: DCD setting
Pulse Duration (Include Units): 5
Spot Size: 1.5 mm
Fluence (Will Not Render If 0): 22
Eye Shield Text: Given the treatment area eye shields were inserted prior to treatment.
Shaving (Optional): The patient shaved at home
Cooling Override: 20/10
Pre-Procedure: Prior to proceeding the treatment areas were cleaned and all present put on their eye protection.
Post-Procedure Care: Immediate endpoint: perifollicular erythema and edema. Post care reviewed with patient.
Tolerated Procedure (Optional): 10 out of 10
Post-Care Instructions: I reviewed with the patient in detail post-care instructions. Patient should avoid sun for a minimum of 4 weeks before and after treatment.
Consent: Written consent obtained, risks reviewed including but not limited to crusting, scabbing, blistering, scarring, darker or lighter pigmentary change, paradoxical hair regrowth, incomplete removal of hair and infection.
Price (Use Numbers Only, No Special Characters Or $): 50.00

## 2022-06-01 ENCOUNTER — APPOINTMENT (RX ONLY)
Dept: URBAN - NONMETROPOLITAN AREA CLINIC 13 | Facility: CLINIC | Age: 52
Setting detail: DERMATOLOGY
End: 2022-06-01

## 2022-06-01 DIAGNOSIS — Z41.9 ENCOUNTER FOR PROCEDURE FOR PURPOSES OTHER THAN REMEDYING HEALTH STATE, UNSPECIFIED: ICD-10-CM

## 2022-06-01 PROCEDURE — ? LASER HAIR REMOVAL

## 2022-06-01 NOTE — PROCEDURE: LASER HAIR REMOVAL
Cooling: DCD setting
Post-Procedure Care: Immediate endpoint: perifollicular erythema and edema. Post care reviewed with patient.
Were Eye Shields Employed?: No
Number Of Prepaid Treatments (Will Not Render If 0): 0
Spot Size: 12 mm
Tolerated Procedure (Optional): 10 out of 10
Fluence (Will Not Render If 0): 20
Laser Type: Alexandrite 755nm
Fluence (Will Not Render If 0): 22
Shaving (Optional): The patient shaved at home
Spot Size: 1.5 mm
Eye Shield Text: Given the treatment area eye shields were inserted prior to treatment.
Cooling Override: 20/10
Pulse Duration (Include Units): 5
Fluence (Will Not Render If 0): 14
Pre-Procedure: Prior to proceeding the treatment areas were cleaned and all present put on their eye protection.
Price (Use Numbers Only, No Special Characters Or $): 50.00
Detail Level: Detailed
Consent: Written consent obtained, risks reviewed including but not limited to crusting, scabbing, blistering, scarring, darker or lighter pigmentary change, paradoxical hair regrowth, incomplete removal of hair and infection.
Post-Care Instructions: I reviewed with the patient in detail post-care instructions. Patient should avoid sun for a minimum of 4 weeks before and after treatment.

## 2022-07-18 ENCOUNTER — APPOINTMENT (RX ONLY)
Dept: URBAN - NONMETROPOLITAN AREA CLINIC 13 | Facility: CLINIC | Age: 52
Setting detail: DERMATOLOGY
End: 2022-07-18

## 2022-07-18 DIAGNOSIS — Z41.9 ENCOUNTER FOR PROCEDURE FOR PURPOSES OTHER THAN REMEDYING HEALTH STATE, UNSPECIFIED: ICD-10-CM

## 2022-07-18 PROCEDURE — ? LASER HAIR REMOVAL

## 2022-07-18 NOTE — PROCEDURE: LASER HAIR REMOVAL
Were Eye Shields Employed?: No
Cooling: DCD setting
Post-Care Instructions: I reviewed with the patient in detail post-care instructions. Patient should avoid sun for a minimum of 4 weeks before and after treatment.
Detail Level: Detailed
Number Of Prepaid Treatments (Will Not Render If 0): 0
Fluence (Will Not Render If 0): 22
Fluence (Will Not Render If 0): 14
Location Override: spot treat some hairs on face
Pre-Procedure: Prior to proceeding the treatment areas were cleaned and all present put on their eye protection.
Spot Size: 1.5 mm
Tolerated Procedure (Optional): 10 out of 10
Cooling Override: 20/10
Pulse Duration (Include Units): 5
Fluence (Will Not Render If 0): 20
Eye Shield Text: Given the treatment area eye shields were inserted prior to treatment.
Spot Size: 12 mm
Shaving (Optional): The patient shaved at home
Laser Type: Nd:Yag 1064nm
Consent: Written consent obtained, risks reviewed including but not limited to crusting, scabbing, blistering, scarring, darker or lighter pigmentary change, paradoxical hair regrowth, incomplete removal of hair and infection.
Post-Procedure Care: Immediate endpoint: perifollicular erythema and edema. Post care reviewed with patient.
Price (Use Numbers Only, No Special Characters Or $): 50.00

## 2022-09-13 ENCOUNTER — APPOINTMENT (RX ONLY)
Dept: URBAN - NONMETROPOLITAN AREA CLINIC 13 | Facility: CLINIC | Age: 52
Setting detail: DERMATOLOGY
End: 2022-09-13

## 2022-09-13 DIAGNOSIS — Z41.9 ENCOUNTER FOR PROCEDURE FOR PURPOSES OTHER THAN REMEDYING HEALTH STATE, UNSPECIFIED: ICD-10-CM

## 2022-09-13 PROCEDURE — ? LASER HAIR REMOVAL

## 2022-09-13 NOTE — PROCEDURE: LASER HAIR REMOVAL
Spot Size: 1.5 mm
Detail Level: Detailed
Cooling Override: 20/10
Were Eye Shields Employed?: No
Number Of Prepaid Treatments (Will Not Render If 0): 0
Cooling: DCD setting
Fluence (Will Not Render If 0): 20
Shaving (Optional): The patient shaved at home
Pre-Procedure: Prior to proceeding the treatment areas were cleaned and all present put on their eye protection.
Spot Size: 12 mm
Eye Shield Text: Given the treatment area eye shields were inserted prior to treatment.
Post-Care Instructions: I reviewed with the patient in detail post-care instructions. Patient should avoid sun for a minimum of 4 weeks before and after treatment.
Pulse Duration (Include Units): 5
Fluence (Will Not Render If 0): 14
Post-Procedure Care: Immediate endpoint: perifollicular erythema and edema. Post care reviewed with patient.
Laser Type: Alexandrite 755nm
Consent: Written consent obtained, risks reviewed including but not limited to crusting, scabbing, blistering, scarring, darker or lighter pigmentary change, paradoxical hair regrowth, incomplete removal of hair and infection.
Tolerated Procedure (Optional): 10 out of 10
Fluence (Will Not Render If 0): 22
Price (Use Numbers Only, No Special Characters Or $): 50.00

## 2022-10-20 NOTE — HPI: RASH (ROSACEA)
10/20/22 1:41 PM     See documentation in the [employer] Smart[employer]       May Davis
How Severe Is Your Rosacea?: moderate
Is This A New Presentation, Or A Follow-Up?: Follow Up Rosacea
Additional History: The CPAP machine seems to be making this worse.